# Patient Record
Sex: FEMALE | Race: WHITE | NOT HISPANIC OR LATINO | Employment: OTHER | URBAN - METROPOLITAN AREA
[De-identification: names, ages, dates, MRNs, and addresses within clinical notes are randomized per-mention and may not be internally consistent; named-entity substitution may affect disease eponyms.]

---

## 2018-01-26 ENCOUNTER — ESTABLISHED COMPREHENSIVE EXAM (OUTPATIENT)
Dept: URBAN - METROPOLITAN AREA CLINIC 36 | Facility: CLINIC | Age: 58
End: 2018-01-26

## 2018-01-26 DIAGNOSIS — H52.7: ICD-10-CM

## 2018-01-26 PROCEDURE — 92015 DETERMINE REFRACTIVE STATE: CPT

## 2018-01-26 PROCEDURE — 92014 COMPRE OPH EXAM EST PT 1/>: CPT

## 2018-01-26 ASSESSMENT — VISUAL ACUITY
OS_SC: 20/60
OD_SC: J8
OD_CC: J1+
OS_SC: J10
OS_CC: 20/20-1
OD_SC: 20/50
OS_CC: J1+
OD_CC: 20/20

## 2018-01-26 ASSESSMENT — TONOMETRY
OD_IOP_MMHG: 16
OS_IOP_MMHG: 16

## 2018-12-14 NOTE — PATIENT DISCUSSION
S/P YAG OU: PATIENT WILL RETURN FOR REGULAR FOLLOW UPS. PATIENT WAS DILATED AND WILL RECEIVE A COPY OF GLASSES PRESCRIPTION, THEY MAY FILL AS DESIRED.

## 2019-01-28 ENCOUNTER — ESTABLISHED COMPREHENSIVE EXAM (OUTPATIENT)
Dept: URBAN - METROPOLITAN AREA CLINIC 36 | Facility: CLINIC | Age: 59
End: 2019-01-28

## 2019-01-28 DIAGNOSIS — H25.811: ICD-10-CM

## 2019-01-28 DIAGNOSIS — H40.033: ICD-10-CM

## 2019-01-28 DIAGNOSIS — H25.812: ICD-10-CM

## 2019-01-28 PROCEDURE — 92014 COMPRE OPH EXAM EST PT 1/>: CPT

## 2019-01-28 PROCEDURE — 92015 DETERMINE REFRACTIVE STATE: CPT

## 2019-01-28 ASSESSMENT — VISUAL ACUITY
OS_SC: >J10
OD_SC: 20/80
OD_SC: >J10
OD_CC: 20/25
OS_SC: 20/70
OS_CC: 20/25+1
OD_CC: J1+
OS_CC: J1

## 2019-01-28 ASSESSMENT — TONOMETRY
OS_IOP_MMHG: 14
OD_IOP_MMHG: 14

## 2019-10-04 ENCOUNTER — ESTABLISHED COMPREHENSIVE EXAM (OUTPATIENT)
Dept: URBAN - METROPOLITAN AREA CLINIC 36 | Facility: CLINIC | Age: 59
End: 2019-10-04

## 2019-10-04 DIAGNOSIS — H52.7: ICD-10-CM

## 2019-10-04 DIAGNOSIS — H25.811: ICD-10-CM

## 2019-10-04 DIAGNOSIS — H04.123: ICD-10-CM

## 2019-10-04 DIAGNOSIS — H53.8: ICD-10-CM

## 2019-10-04 DIAGNOSIS — H25.812: ICD-10-CM

## 2019-10-04 PROCEDURE — 92014 COMPRE OPH EXAM EST PT 1/>: CPT

## 2019-10-04 PROCEDURE — 92015GRNC REFRACTION GLASSES RECHECK - NO CHARGE

## 2019-10-04 ASSESSMENT — TONOMETRY
OS_IOP_MMHG: 12
OD_IOP_MMHG: 12

## 2019-10-04 ASSESSMENT — VISUAL ACUITY
OS_CC: 20/20-1
OD_CC: J1+
OS_CC: J1+
OD_CC: 20/20

## 2020-10-19 ENCOUNTER — ESTABLISHED COMPREHENSIVE EXAM (OUTPATIENT)
Dept: URBAN - METROPOLITAN AREA CLINIC 36 | Facility: CLINIC | Age: 60
End: 2020-10-19

## 2020-10-19 DIAGNOSIS — H52.7: ICD-10-CM

## 2020-10-19 DIAGNOSIS — H25.812: ICD-10-CM

## 2020-10-19 DIAGNOSIS — H25.811: ICD-10-CM

## 2020-10-19 DIAGNOSIS — H40.033: ICD-10-CM

## 2020-10-19 DIAGNOSIS — H04.123: ICD-10-CM

## 2020-10-19 PROCEDURE — 92015 DETERMINE REFRACTIVE STATE: CPT

## 2020-10-19 PROCEDURE — 92014 COMPRE OPH EXAM EST PT 1/>: CPT

## 2020-10-19 ASSESSMENT — TONOMETRY
OS_IOP_MMHG: 14
OD_IOP_MMHG: 14

## 2020-10-19 ASSESSMENT — VISUAL ACUITY
OS_CC: J1+
OS_CC: 20/30
OD_CC: J3
OD_CC: 20/30
OD_SC: 20/80-1
OS_SC: >J10
OS_SC: 20/200
OD_SC: >J10

## 2021-10-20 ENCOUNTER — ESTABLISHED COMPREHENSIVE EXAM (OUTPATIENT)
Dept: URBAN - METROPOLITAN AREA CLINIC 36 | Facility: CLINIC | Age: 61
End: 2021-10-20

## 2021-10-20 DIAGNOSIS — H04.123: ICD-10-CM

## 2021-10-20 DIAGNOSIS — H25.812: ICD-10-CM

## 2021-10-20 DIAGNOSIS — H25.811: ICD-10-CM

## 2021-10-20 DIAGNOSIS — H40.033: ICD-10-CM

## 2021-10-20 PROCEDURE — 92014 COMPRE OPH EXAM EST PT 1/>: CPT

## 2021-10-20 ASSESSMENT — VISUAL ACUITY
OD_SC: 20/70
OS_SC: >J10
OD_CC: J1
OS_CC: 20/20-2
OD_CC: 20/20
OS_CC: J1
OD_SC: >J10
OS_SC: 20/200+1

## 2021-10-20 ASSESSMENT — TONOMETRY
OD_IOP_MMHG: 18
OS_IOP_MMHG: 18

## 2022-11-02 ENCOUNTER — COMPREHENSIVE EXAM (OUTPATIENT)
Dept: URBAN - METROPOLITAN AREA CLINIC 36 | Facility: CLINIC | Age: 62
End: 2022-11-02

## 2022-11-02 DIAGNOSIS — H04.123: ICD-10-CM

## 2022-11-02 DIAGNOSIS — H52.7: ICD-10-CM

## 2022-11-02 DIAGNOSIS — H25.813: ICD-10-CM

## 2022-11-02 DIAGNOSIS — H40.033: ICD-10-CM

## 2022-11-02 PROCEDURE — 92014 COMPRE OPH EXAM EST PT 1/>: CPT

## 2022-11-02 PROCEDURE — 92015 DETERMINE REFRACTIVE STATE: CPT

## 2022-11-02 ASSESSMENT — VISUAL ACUITY
OD_CC: J1
OS_CC: 20/25
OD_CC: 20/20
OD_SC: 20/80
OD_SC: J8
OS_SC: 20/70-2
OS_CC: J1+
OS_SC: J8

## 2022-11-02 ASSESSMENT — TONOMETRY
OD_IOP_MMHG: 18
OS_IOP_MMHG: 17

## 2023-12-08 ENCOUNTER — COMPREHENSIVE EXAM (OUTPATIENT)
Dept: URBAN - METROPOLITAN AREA CLINIC 36 | Facility: CLINIC | Age: 63
End: 2023-12-08

## 2023-12-08 DIAGNOSIS — H40.033: ICD-10-CM

## 2023-12-08 DIAGNOSIS — H25.813: ICD-10-CM

## 2023-12-08 DIAGNOSIS — H04.123: ICD-10-CM

## 2023-12-08 PROCEDURE — 92015 DETERMINE REFRACTIVE STATE: CPT

## 2023-12-08 PROCEDURE — 92014 COMPRE OPH EXAM EST PT 1/>: CPT

## 2023-12-08 ASSESSMENT — VISUAL ACUITY
OS_CC: 20/20
OD_SC: 20/100
OS_SC: >J10
OS_SC: 20/80-1
OS_CC: J1+
OD_CC: 20/20
OD_CC: J1+
OD_SC: >J10

## 2023-12-08 ASSESSMENT — TONOMETRY
OS_IOP_MMHG: 18
OD_IOP_MMHG: 16

## 2025-01-24 ENCOUNTER — COMPREHENSIVE EXAM (OUTPATIENT)
Age: 65
End: 2025-01-24

## 2025-01-24 DIAGNOSIS — H01.02B: ICD-10-CM

## 2025-01-24 DIAGNOSIS — H52.7: ICD-10-CM

## 2025-01-24 DIAGNOSIS — H40.033: ICD-10-CM

## 2025-01-24 DIAGNOSIS — H04.123: ICD-10-CM

## 2025-01-24 DIAGNOSIS — H25.813: ICD-10-CM

## 2025-01-24 DIAGNOSIS — H01.02A: ICD-10-CM

## 2025-01-24 PROCEDURE — 92015 DETERMINE REFRACTIVE STATE: CPT

## 2025-01-24 PROCEDURE — 92014 COMPRE OPH EXAM EST PT 1/>: CPT
